# Patient Record
Sex: MALE | Race: OTHER | NOT HISPANIC OR LATINO | Employment: OTHER | URBAN - METROPOLITAN AREA
[De-identification: names, ages, dates, MRNs, and addresses within clinical notes are randomized per-mention and may not be internally consistent; named-entity substitution may affect disease eponyms.]

---

## 2023-04-11 ENCOUNTER — NEW PATIENT (OUTPATIENT)
Dept: URBAN - METROPOLITAN AREA CLINIC 53 | Facility: CLINIC | Age: 83
End: 2023-04-11

## 2023-04-11 DIAGNOSIS — H43.812: ICD-10-CM

## 2023-04-11 DIAGNOSIS — Z97.3: ICD-10-CM

## 2023-04-11 DIAGNOSIS — H16.223: ICD-10-CM

## 2023-04-11 DIAGNOSIS — H25.11: ICD-10-CM

## 2023-04-11 DIAGNOSIS — H25.812: ICD-10-CM

## 2023-04-11 PROCEDURE — 92004 COMPRE OPH EXAM NEW PT 1/>: CPT

## 2023-04-11 PROCEDURE — 92310-2E ESTABLISHED CL PATIENT TORIC (ASTIGMATISM) SINGLE VISION SOFT LENS EVALUATION

## 2023-04-11 ASSESSMENT — KERATOMETRY
OS_AXISANGLE_DEGREES: 119
OD_K2POWER_DIOPTERS: 41.75
OD_AXISANGLE_DEGREES: 62
OS_K2POWER_DIOPTERS: 43.50
OS_AXISANGLE2_DEGREES: 29
OS_K1POWER_DIOPTERS: 38.50
OD_K1POWER_DIOPTERS: 40.50
OD_AXISANGLE2_DEGREES: 152

## 2023-04-11 ASSESSMENT — VISUAL ACUITY
OS_CC: 20/50
OS_CC: 20/40
OS_SC: 20/50
OU_CC: 20/40-1
OU_SC: 20/50
OU_CC: 20/25
OU_CC: J1+@14"
OD_CC: 20/25-1
OD_SC: 20/150
OU_CC: J3@14"
OS_GLARE: 20/50-1
OD_CC: 20/50
OD_GLARE: 20/40
OD_GLARE: 20/40

## 2023-04-11 ASSESSMENT — TONOMETRY
OD_IOP_MMHG: 15
OS_IOP_MMHG: 15

## 2023-05-04 ENCOUNTER — CONTACT LENSES/GLASSES VISIT (OUTPATIENT)
Dept: URBAN - METROPOLITAN AREA CLINIC 53 | Facility: CLINIC | Age: 83
End: 2023-05-04

## 2023-05-04 DIAGNOSIS — H52.4: ICD-10-CM

## 2023-05-04 PROCEDURE — 92015GRNC REFRACTION GLASSES RECHECK NO CHARGE

## 2023-05-04 ASSESSMENT — KERATOMETRY
OD_AXISANGLE_DEGREES: 62
OS_AXISANGLE2_DEGREES: 29
OD_K1POWER_DIOPTERS: 40.50
OS_K1POWER_DIOPTERS: 38.50
OS_AXISANGLE_DEGREES: 119
OD_K2POWER_DIOPTERS: 41.75
OD_AXISANGLE2_DEGREES: 152
OS_K2POWER_DIOPTERS: 43.50

## 2023-05-04 ASSESSMENT — VISUAL ACUITY
OS_PH: 20/30-2
OD_PH: 20/25
OD_CC: 20/50-1
OS_CC: 20/60

## 2023-10-19 ENCOUNTER — COMPREHENSIVE EXAM (OUTPATIENT)
Dept: URBAN - METROPOLITAN AREA CLINIC 53 | Facility: CLINIC | Age: 83
End: 2023-10-19

## 2023-10-19 DIAGNOSIS — H25.812: ICD-10-CM

## 2023-10-19 DIAGNOSIS — Z97.3: ICD-10-CM

## 2023-10-19 DIAGNOSIS — Z98.890: ICD-10-CM

## 2023-10-19 DIAGNOSIS — H43.813: ICD-10-CM

## 2023-10-19 DIAGNOSIS — H52.4: ICD-10-CM

## 2023-10-19 DIAGNOSIS — H16.223: ICD-10-CM

## 2023-10-19 DIAGNOSIS — H25.11: ICD-10-CM

## 2023-10-19 PROCEDURE — 92134 CPTRZ OPH DX IMG PST SGM RTA: CPT

## 2023-10-19 PROCEDURE — 92015 DETERMINE REFRACTIVE STATE: CPT

## 2023-10-19 PROCEDURE — 92014 COMPRE OPH EXAM EST PT 1/>: CPT

## 2023-10-19 ASSESSMENT — VISUAL ACUITY
OD_GLARE: 20/50
OS_GLARE: 20/40
OS_CC: 20/50+2
OD_GLARE: 20/30
OD_CC: 20/50+2
OS_GLARE: 20/30
OU_CC: J1+

## 2023-10-19 ASSESSMENT — KERATOMETRY
OS_AXISANGLE_DEGREES: 119
OS_K2POWER_DIOPTERS: 43.50
OD_K2POWER_DIOPTERS: 41.75
OS_K1POWER_DIOPTERS: 38.50
OD_AXISANGLE2_DEGREES: 152
OS_AXISANGLE2_DEGREES: 29
OD_K1POWER_DIOPTERS: 40.50
OD_AXISANGLE_DEGREES: 62

## 2023-10-19 ASSESSMENT — TONOMETRY
OD_IOP_MMHG: 16
OS_IOP_MMHG: 15

## 2023-10-25 ENCOUNTER — APPOINTMENT (RX ONLY)
Dept: URBAN - METROPOLITAN AREA CLINIC 58 | Facility: CLINIC | Age: 83
Setting detail: DERMATOLOGY
End: 2023-10-25

## 2023-10-25 DIAGNOSIS — L738 OTHER SPECIFIED DISEASES OF HAIR AND HAIR FOLLICLES: ICD-10-CM | Status: INADEQUATELY CONTROLLED

## 2023-10-25 DIAGNOSIS — L57.0 ACTINIC KERATOSIS: ICD-10-CM | Status: INADEQUATELY CONTROLLED

## 2023-10-25 DIAGNOSIS — L57.8 OTHER SKIN CHANGES DUE TO CHRONIC EXPOSURE TO NONIONIZING RADIATION: ICD-10-CM

## 2023-10-25 DIAGNOSIS — L73.9 FOLLICULAR DISORDER, UNSPECIFIED: ICD-10-CM | Status: INADEQUATELY CONTROLLED

## 2023-10-25 DIAGNOSIS — L663 OTHER SPECIFIED DISEASES OF HAIR AND HAIR FOLLICLES: ICD-10-CM | Status: INADEQUATELY CONTROLLED

## 2023-10-25 DIAGNOSIS — D18.0 HEMANGIOMA: ICD-10-CM

## 2023-10-25 DIAGNOSIS — D22 MELANOCYTIC NEVI: ICD-10-CM

## 2023-10-25 DIAGNOSIS — L81.4 OTHER MELANIN HYPERPIGMENTATION: ICD-10-CM

## 2023-10-25 DIAGNOSIS — L82.1 OTHER SEBORRHEIC KERATOSIS: ICD-10-CM

## 2023-10-25 PROBLEM — L02.223 FURUNCLE OF CHEST WALL: Status: ACTIVE | Noted: 2023-10-25

## 2023-10-25 PROBLEM — D22.62 MELANOCYTIC NEVI OF LEFT UPPER LIMB, INCLUDING SHOULDER: Status: ACTIVE | Noted: 2023-10-25

## 2023-10-25 PROBLEM — D48.5 NEOPLASM OF UNCERTAIN BEHAVIOR OF SKIN: Status: ACTIVE | Noted: 2023-10-25

## 2023-10-25 PROBLEM — L02.02 FURUNCLE OF FACE: Status: ACTIVE | Noted: 2023-10-25

## 2023-10-25 PROBLEM — D18.01 HEMANGIOMA OF SKIN AND SUBCUTANEOUS TISSUE: Status: ACTIVE | Noted: 2023-10-25

## 2023-10-25 PROCEDURE — 11102 TANGNTL BX SKIN SINGLE LES: CPT

## 2023-10-25 PROCEDURE — ? PRESCRIPTION MEDICATION MANAGEMENT

## 2023-10-25 PROCEDURE — 17003 DESTRUCT PREMALG LES 2-14: CPT

## 2023-10-25 PROCEDURE — ? BIOPSY BY SHAVE METHOD

## 2023-10-25 PROCEDURE — 17000 DESTRUCT PREMALG LESION: CPT | Mod: 59

## 2023-10-25 PROCEDURE — 99204 OFFICE O/P NEW MOD 45 MIN: CPT | Mod: 25

## 2023-10-25 PROCEDURE — ? FULL BODY SKIN EXAM

## 2023-10-25 PROCEDURE — ? LIQUID NITROGEN

## 2023-10-25 PROCEDURE — ? PRESCRIPTION

## 2023-10-25 PROCEDURE — ? COUNSELING

## 2023-10-25 PROCEDURE — ? TREATMENT REGIMEN

## 2023-10-25 RX ORDER — DOXYCYCLINE HYCLATE 100 MG/1
CAPSULE, GELATIN COATED ORAL QD
Qty: 60 | Refills: 0 | Status: ERX | COMMUNITY
Start: 2023-10-25

## 2023-10-25 RX ORDER — CLINDAMYCIN PHOSPHATE 10 MG/ML
SOLUTION TOPICAL
Qty: 60 | Refills: 2 | Status: ERX | COMMUNITY
Start: 2023-10-25

## 2023-10-25 RX ADMIN — CLINDAMYCIN PHOSPHATE: 10 SOLUTION TOPICAL at 00:00

## 2023-10-25 RX ADMIN — DOXYCYCLINE HYCLATE: 100 CAPSULE, GELATIN COATED ORAL at 00:00

## 2023-10-25 ASSESSMENT — LOCATION ZONE DERM
LOCATION ZONE: SCALP
LOCATION ZONE: HAND
LOCATION ZONE: TRUNK
LOCATION ZONE: FACE
LOCATION ZONE: NOSE

## 2023-10-25 ASSESSMENT — LOCATION DETAILED DESCRIPTION DERM
LOCATION DETAILED: LEFT FOREHEAD
LOCATION DETAILED: 3RD WEB SPACE LEFT HAND
LOCATION DETAILED: LEFT ULNAR DORSAL HAND
LOCATION DETAILED: RIGHT SUPERIOR PARIETAL SCALP
LOCATION DETAILED: SUPERIOR MID FOREHEAD
LOCATION DETAILED: LEFT CHIN
LOCATION DETAILED: LEFT RADIAL DORSAL HAND
LOCATION DETAILED: LEFT LATERAL SUPERIOR CHEST
LOCATION DETAILED: RIGHT SUPERIOR MEDIAL BUCCAL CHEEK
LOCATION DETAILED: RIGHT LATERAL FOREHEAD
LOCATION DETAILED: NASAL SUPRATIP

## 2023-10-25 ASSESSMENT — LOCATION SIMPLE DESCRIPTION DERM
LOCATION SIMPLE: RIGHT CHEEK
LOCATION SIMPLE: CHIN
LOCATION SIMPLE: SUPERIOR FOREHEAD
LOCATION SIMPLE: NOSE
LOCATION SIMPLE: CHEST
LOCATION SIMPLE: RIGHT FOREHEAD
LOCATION SIMPLE: LEFT FOREHEAD
LOCATION SIMPLE: LEFT HAND
LOCATION SIMPLE: SCALP

## 2023-10-25 NOTE — PROCEDURE: PRESCRIPTION MEDICATION MANAGEMENT
Detail Level: Simple
Initiate Treatment: doxycycline hyclate QD for 60 days\\nclindamycin phosphate mornings as spot treatment
Render In Strict Bullet Format?: No

## 2024-01-24 ENCOUNTER — PRE-OP/H&P (OUTPATIENT)
Dept: URBAN - METROPOLITAN AREA CLINIC 53 | Facility: CLINIC | Age: 84
End: 2024-01-24

## 2024-01-24 DIAGNOSIS — H25.812: ICD-10-CM

## 2024-01-24 DIAGNOSIS — H43.813: ICD-10-CM

## 2024-01-24 DIAGNOSIS — H25.11: ICD-10-CM

## 2024-01-24 PROCEDURE — 92025IOL CORNEAL TOPOGRAPHY PREMIUM IOL

## 2024-01-24 PROCEDURE — 92134 CPTRZ OPH DX IMG PST SGM RTA: CPT

## 2024-01-24 PROCEDURE — 99214 OFFICE O/P EST MOD 30 MIN: CPT

## 2024-01-24 PROCEDURE — 92136 OPHTHALMIC BIOMETRY: CPT

## 2024-01-24 ASSESSMENT — KERATOMETRY
OD_K2POWER_DIOPTERS: 39.50
OD_K1POWER_DIOPTERS: 41.00
OD_AXISANGLE_DEGREES: 160
OD_AXISANGLE2_DEGREES: 70
OD_K1POWER_DIOPTERS: 39.00
OD_AXISANGLE2_DEGREES: 157
OD_AXISANGLE_DEGREES: 67
OS_AXISANGLE2_DEGREES: 20
OS_AXISANGLE_DEGREES: 110
OS_K2POWER_DIOPTERS: 41.00
OS_K1POWER_DIOPTERS: 42.12
OD_K2POWER_DIOPTERS: 40.75

## 2024-01-24 ASSESSMENT — VISUAL ACUITY
OD_CC: 20/40
OS_PH: 20/30
OS_CC: 20/40
OD_PH: 20/30

## 2024-01-24 ASSESSMENT — TONOMETRY
OD_IOP_MMHG: 15
OS_IOP_MMHG: 14

## 2024-01-31 ASSESSMENT — KERATOMETRY
OD_AXISANGLE_DEGREES: 160
OD_AXISANGLE2_DEGREES: 157
OS_K2POWER_DIOPTERS: 41.00
OD_K2POWER_DIOPTERS: 39.50
OD_K2POWER_DIOPTERS: 40.75
OS_AXISANGLE2_DEGREES: 20
OD_K1POWER_DIOPTERS: 39.00
OD_K1POWER_DIOPTERS: 41.00
OD_AXISANGLE_DEGREES: 67
OD_AXISANGLE2_DEGREES: 70
OS_AXISANGLE_DEGREES: 110
OS_K1POWER_DIOPTERS: 42.12

## 2024-02-01 ENCOUNTER — SURGERY/PROCEDURE (OUTPATIENT)
Dept: URBAN - METROPOLITAN AREA SURGERY 16 | Facility: SURGERY | Age: 84
End: 2024-02-01

## 2024-02-01 DIAGNOSIS — H25.811: ICD-10-CM

## 2024-02-01 PROCEDURE — 66984LAL CATARACT EXTRACTION WITH IOL, LAL

## 2024-02-01 PROCEDURE — 99199PPLAL LIGHT ADJUSTABLE LENS

## 2024-02-02 ENCOUNTER — POST-OP (OUTPATIENT)
Dept: URBAN - METROPOLITAN AREA CLINIC 53 | Facility: CLINIC | Age: 84
End: 2024-02-02

## 2024-02-02 DIAGNOSIS — Z98.41: ICD-10-CM

## 2024-02-02 PROCEDURE — 99024 POSTOP FOLLOW-UP VISIT: CPT

## 2024-02-02 ASSESSMENT — KERATOMETRY
OS_AXISANGLE_DEGREES: 110
OD_AXISANGLE_DEGREES: 67
OS_K1POWER_DIOPTERS: 42.12
OD_K2POWER_DIOPTERS: 39.50
OD_K2POWER_DIOPTERS: 40.75
OS_AXISANGLE2_DEGREES: 20
OS_K2POWER_DIOPTERS: 41.00
OD_K1POWER_DIOPTERS: 41.00
OD_AXISANGLE2_DEGREES: 157
OD_AXISANGLE2_DEGREES: 70
OD_AXISANGLE_DEGREES: 160
OD_K1POWER_DIOPTERS: 39.00

## 2024-02-02 ASSESSMENT — TONOMETRY: OD_IOP_MMHG: 19

## 2024-02-02 ASSESSMENT — VISUAL ACUITY: OD_SC: 20/70

## 2024-02-06 ENCOUNTER — POST OP/EVAL OF SECOND EYE (OUTPATIENT)
Dept: URBAN - METROPOLITAN AREA CLINIC 53 | Facility: CLINIC | Age: 84
End: 2024-02-06

## 2024-02-06 DIAGNOSIS — Z96.1: ICD-10-CM

## 2024-02-06 DIAGNOSIS — Z98.41: ICD-10-CM

## 2024-02-06 DIAGNOSIS — H25.812: ICD-10-CM

## 2024-02-06 PROCEDURE — 99213 OFFICE O/P EST LOW 20 MIN: CPT

## 2024-02-06 ASSESSMENT — KERATOMETRY
OS_AXISANGLE2_DEGREES: 20
OD_AXISANGLE_DEGREES: 67
OD_AXISANGLE2_DEGREES: 157
OD_AXISANGLE_DEGREES: 160
OD_K2POWER_DIOPTERS: 40.75
OD_K1POWER_DIOPTERS: 41.00
OD_K2POWER_DIOPTERS: 39.50
OD_AXISANGLE2_DEGREES: 70
OS_K1POWER_DIOPTERS: 42.12
OS_AXISANGLE_DEGREES: 110
OS_K2POWER_DIOPTERS: 41.00
OD_K1POWER_DIOPTERS: 39.00

## 2024-02-06 ASSESSMENT — TONOMETRY
OS_IOP_MMHG: 15
OD_IOP_MMHG: 15

## 2024-02-06 ASSESSMENT — VISUAL ACUITY
OS_CC: 20/30
OD_SC: 20/30

## 2024-02-12 ASSESSMENT — KERATOMETRY
OS_AXISANGLE2_DEGREES: 20
OD_K1POWER_DIOPTERS: 39.00
OS_K2POWER_DIOPTERS: 41.00
OD_AXISANGLE_DEGREES: 160
OD_AXISANGLE2_DEGREES: 157
OD_AXISANGLE_DEGREES: 67
OD_K2POWER_DIOPTERS: 40.75
OD_K2POWER_DIOPTERS: 39.50
OD_AXISANGLE2_DEGREES: 70
OS_K1POWER_DIOPTERS: 42.12
OD_K1POWER_DIOPTERS: 41.00
OS_AXISANGLE_DEGREES: 110

## 2024-02-15 ENCOUNTER — SURGERY/PROCEDURE (OUTPATIENT)
Dept: URBAN - METROPOLITAN AREA SURGERY 16 | Facility: SURGERY | Age: 84
End: 2024-02-15

## 2024-02-15 DIAGNOSIS — H25.812: ICD-10-CM

## 2024-02-15 PROCEDURE — 99199PPLAL LIGHT ADJUSTABLE LENS

## 2024-02-15 PROCEDURE — 66984LAL CATARACT EXTRACTION WITH IOL, LAL

## 2024-02-16 ENCOUNTER — POST-OP (OUTPATIENT)
Dept: URBAN - METROPOLITAN AREA CLINIC 53 | Facility: CLINIC | Age: 84
End: 2024-02-16

## 2024-02-16 DIAGNOSIS — Z98.42: ICD-10-CM

## 2024-02-16 DIAGNOSIS — Z96.1: ICD-10-CM

## 2024-02-16 ASSESSMENT — KERATOMETRY
OD_K2POWER_DIOPTERS: 39.50
OS_K2POWER_DIOPTERS: 41.00
OD_AXISANGLE_DEGREES: 160
OS_AXISANGLE2_DEGREES: 20
OD_AXISANGLE2_DEGREES: 157
OD_AXISANGLE2_DEGREES: 70
OD_K1POWER_DIOPTERS: 41.00
OD_K1POWER_DIOPTERS: 39.00
OD_K2POWER_DIOPTERS: 40.75
OD_AXISANGLE_DEGREES: 67
OS_AXISANGLE_DEGREES: 110
OS_K1POWER_DIOPTERS: 42.12

## 2024-02-16 ASSESSMENT — VISUAL ACUITY: OS_SC: 20/80+2

## 2024-02-16 ASSESSMENT — TONOMETRY: OS_IOP_MMHG: 22

## 2024-02-21 ENCOUNTER — POST-OP (OUTPATIENT)
Dept: URBAN - METROPOLITAN AREA CLINIC 53 | Facility: CLINIC | Age: 84
End: 2024-02-21

## 2024-02-21 DIAGNOSIS — Z98.42: ICD-10-CM

## 2024-02-21 DIAGNOSIS — Z96.1: ICD-10-CM

## 2024-02-21 PROCEDURE — 99024 POSTOP FOLLOW-UP VISIT: CPT

## 2024-02-21 PROCEDURE — 92134 CPTRZ OPH DX IMG PST SGM RTA: CPT

## 2024-02-21 ASSESSMENT — TONOMETRY
OS_IOP_MMHG: 12
OD_IOP_MMHG: 12

## 2024-02-21 ASSESSMENT — VISUAL ACUITY
OD_SC: 20/30
OU_SC: J2@12"
OS_SC: 20/60+2
OS_PH: 20/40

## 2024-04-17 ENCOUNTER — POST-OP (OUTPATIENT)
Dept: URBAN - METROPOLITAN AREA CLINIC 53 | Facility: CLINIC | Age: 84
End: 2024-04-17

## 2024-04-17 DIAGNOSIS — Z96.1: ICD-10-CM

## 2024-04-17 PROCEDURE — 99024 POSTOP FOLLOW-UP VISIT: CPT

## 2024-04-17 ASSESSMENT — VISUAL ACUITY
OU_SC: J3-2
OS_SC: J3-3
OD_SC: 20/30
OD_SC: J5
OU_SC: 20/25
OS_SC: 20/30

## 2024-04-24 ENCOUNTER — POST-OP (OUTPATIENT)
Dept: URBAN - METROPOLITAN AREA CLINIC 53 | Facility: CLINIC | Age: 84
End: 2024-04-24

## 2024-04-24 DIAGNOSIS — Z96.1: ICD-10-CM

## 2024-04-24 PROCEDURE — 99024 POSTOP FOLLOW-UP VISIT: CPT

## 2024-04-24 RX ORDER — PREDNISOLONE ACETATE 10 MG/ML: 1 SUSPENSION/ DROPS OPHTHALMIC ONCE A DAY

## 2024-04-24 ASSESSMENT — VISUAL ACUITY
OS_SC: 20/30-1
OD_SC: 20/25+1
OU_SC: 20/20-1

## 2024-05-01 ENCOUNTER — POST-OP (OUTPATIENT)
Dept: URBAN - METROPOLITAN AREA CLINIC 53 | Facility: CLINIC | Age: 84
End: 2024-05-01

## 2024-05-01 DIAGNOSIS — Z96.1: ICD-10-CM

## 2024-05-01 PROCEDURE — 99024 POSTOP FOLLOW-UP VISIT: CPT

## 2024-05-01 ASSESSMENT — KERATOMETRY
OS_K1POWER_DIOPTERS: 38.50
OS_AXISANGLE_DEGREES: 120
OD_K1POWER_DIOPTERS: 40.00
OS_AXISANGLE2_DEGREES: 030
OS_K2POWER_DIOPTERS: 43.75
OD_K2POWER_DIOPTERS: 41.50
OD_AXISANGLE2_DEGREES: 164
OD_AXISANGLE_DEGREES: 074

## 2024-05-01 ASSESSMENT — VISUAL ACUITY
OU_SC: 20/20+2
OS_SC: 20/15-2
OD_SC: 20/20

## 2024-05-08 ENCOUNTER — POST-OP (OUTPATIENT)
Dept: URBAN - METROPOLITAN AREA CLINIC 53 | Facility: CLINIC | Age: 84
End: 2024-05-08

## 2024-05-08 DIAGNOSIS — Z96.1: ICD-10-CM

## 2024-05-08 PROCEDURE — 99024 POSTOP FOLLOW-UP VISIT: CPT

## 2024-05-08 ASSESSMENT — VISUAL ACUITY
OD_SC: 20/30
OS_SC: 20/50
OU_SC: 20/25

## 2024-05-08 ASSESSMENT — KERATOMETRY
OD_K1POWER_DIOPTERS: 40.00
OD_K2POWER_DIOPTERS: 41.50
OD_AXISANGLE2_DEGREES: 164
OS_K2POWER_DIOPTERS: 43.75
OS_K1POWER_DIOPTERS: 38.50
OS_AXISANGLE_DEGREES: 120
OS_AXISANGLE2_DEGREES: 030
OD_AXISANGLE_DEGREES: 074

## 2024-05-15 ENCOUNTER — POST-OP (OUTPATIENT)
Dept: URBAN - METROPOLITAN AREA CLINIC 53 | Facility: CLINIC | Age: 84
End: 2024-05-15

## 2024-05-15 DIAGNOSIS — Z96.1: ICD-10-CM

## 2024-05-15 PROCEDURE — 99024 POSTOP FOLLOW-UP VISIT: CPT

## 2024-05-15 ASSESSMENT — KERATOMETRY
OS_K1POWER_DIOPTERS: 38.50
OD_K1POWER_DIOPTERS: 40.00
OD_AXISANGLE2_DEGREES: 164
OS_AXISANGLE_DEGREES: 120
OS_K2POWER_DIOPTERS: 43.75
OD_K2POWER_DIOPTERS: 41.50
OS_AXISANGLE2_DEGREES: 030
OD_AXISANGLE_DEGREES: 074

## 2024-05-15 ASSESSMENT — VISUAL ACUITY
OS_SC: 20/30
OU_SC: 20/20-2 PUSH
OD_SC: 20/25-1 PUSH

## 2024-05-22 ENCOUNTER — APPOINTMENT (RX ONLY)
Dept: URBAN - METROPOLITAN AREA CLINIC 58 | Facility: CLINIC | Age: 84
Setting detail: DERMATOLOGY
End: 2024-05-22

## 2024-05-22 DIAGNOSIS — L73.9 FOLLICULAR DISORDER, UNSPECIFIED: ICD-10-CM

## 2024-05-22 DIAGNOSIS — L663 OTHER SPECIFIED DISEASES OF HAIR AND HAIR FOLLICLES: ICD-10-CM

## 2024-05-22 DIAGNOSIS — L738 OTHER SPECIFIED DISEASES OF HAIR AND HAIR FOLLICLES: ICD-10-CM

## 2024-05-22 PROBLEM — L02.02 FURUNCLE OF FACE: Status: ACTIVE | Noted: 2024-05-22

## 2024-05-22 PROBLEM — L02.223 FURUNCLE OF CHEST WALL: Status: ACTIVE | Noted: 2024-05-22

## 2024-05-22 PROCEDURE — ? MDM - TREATMENT GOALS

## 2024-05-22 PROCEDURE — ? ADDITIONAL NOTES

## 2024-05-22 PROCEDURE — ? PRESCRIPTION

## 2024-05-22 PROCEDURE — 99214 OFFICE O/P EST MOD 30 MIN: CPT

## 2024-05-22 PROCEDURE — ? PRESCRIPTION MEDICATION MANAGEMENT

## 2024-05-22 PROCEDURE — ? COUNSELING

## 2024-05-22 RX ORDER — DOXYCYCLINE HYCLATE 50 MG/1
CAPSULE, GELATIN COATED ORAL
Qty: 60 | Refills: 4 | Status: ERX | COMMUNITY
Start: 2024-05-22

## 2024-05-22 RX ORDER — CLINDAMYCIN PHOSPHATE AND BENZOYL PEROXIDE 1 %-5 %
KIT TOPICAL
Qty: 50 | Refills: 3 | Status: ERX | COMMUNITY
Start: 2024-05-22

## 2024-05-22 RX ADMIN — CLINDAMYCIN PHOSPHATE AND BENZOYL PEROXIDE: KIT at 00:00

## 2024-05-22 RX ADMIN — DOXYCYCLINE HYCLATE: 50 CAPSULE, GELATIN COATED ORAL at 00:00

## 2024-05-22 ASSESSMENT — LOCATION DETAILED DESCRIPTION DERM
LOCATION DETAILED: RIGHT SUPERIOR MEDIAL BUCCAL CHEEK
LOCATION DETAILED: LEFT CHIN
LOCATION DETAILED: LEFT LATERAL SUPERIOR CHEST

## 2024-05-22 ASSESSMENT — LOCATION ZONE DERM
LOCATION ZONE: FACE
LOCATION ZONE: TRUNK

## 2024-05-22 ASSESSMENT — LOCATION SIMPLE DESCRIPTION DERM
LOCATION SIMPLE: RIGHT CHEEK
LOCATION SIMPLE: CHEST
LOCATION SIMPLE: CHIN

## 2024-05-22 NOTE — PROCEDURE: ADDITIONAL NOTES
Render Risk Assessment In Note?: no
Detail Level: Simple
Additional Notes: pt with significant Folliculitis' on face,neck, and trunk that cleared significantly with doxy 100mg daily for 2 months. Pt was very happy but then after running out of medication, the foliculitis returned. Pt was inconsistent with using topical clindamycin. Will asses response with lower subantimicrobial dosing of doxy and add benzaclin. Consider long term maintenance of doxy 40 or 50mg if uncontrolled with topicals.

## 2024-05-22 NOTE — PROCEDURE: PRESCRIPTION MEDICATION MANAGEMENT
Detail Level: Simple
Initiate Treatment: doxycycline hyclate 50 mg capsule - Take 1 tab QAM with food and water. If no improvement after 2-3 weeks, advised patient to increase to 100 mg. \\n\\nclindamycin 1 %-benzoyl peroxide 5 % topical gel: Apply once a day in the morning to affected areas on face and neck as spot treatment.
Render In Strict Bullet Format?: No
Discontinue Regimen: clindamycin phosphate

## 2024-05-31 ENCOUNTER — POST-OP (OUTPATIENT)
Dept: URBAN - METROPOLITAN AREA CLINIC 53 | Facility: CLINIC | Age: 84
End: 2024-05-31

## 2024-05-31 DIAGNOSIS — Z96.1: ICD-10-CM

## 2024-05-31 PROCEDURE — 66984LALA LAL ADJUSTMENT

## 2024-05-31 ASSESSMENT — KERATOMETRY
OD_AXISANGLE2_DEGREES: 164
OS_K1POWER_DIOPTERS: 38.50
OD_K2POWER_DIOPTERS: 41.50
OS_AXISANGLE_DEGREES: 120
OD_K1POWER_DIOPTERS: 40.00
OS_K2POWER_DIOPTERS: 43.75
OS_AXISANGLE2_DEGREES: 030
OD_AXISANGLE_DEGREES: 074

## 2024-05-31 ASSESSMENT — VISUAL ACUITY
OS_SC: 20/30-2 PUSH
OU_SC: J1+ @ 14"
OU_SC: 20/20
OD_SC: 20/20-1 PUSH
OS_SC: J1+

## 2024-06-07 ENCOUNTER — POST-OP (OUTPATIENT)
Dept: URBAN - METROPOLITAN AREA CLINIC 53 | Facility: CLINIC | Age: 84
End: 2024-06-07

## 2024-06-07 DIAGNOSIS — Z96.1: ICD-10-CM

## 2024-06-07 PROCEDURE — 66984LALA LAL ADJUSTMENT

## 2024-06-07 ASSESSMENT — VISUAL ACUITY
OS_SC: 20/40-2
OU_SC: 20/20-1
OD_SC: 20/20 PUSH

## 2024-06-07 ASSESSMENT — KERATOMETRY
OS_AXISANGLE_DEGREES: 120
OD_K1POWER_DIOPTERS: 40.00
OD_AXISANGLE2_DEGREES: 164
OD_AXISANGLE_DEGREES: 074
OS_AXISANGLE2_DEGREES: 030
OD_K2POWER_DIOPTERS: 41.50
OS_K1POWER_DIOPTERS: 38.50
OS_K2POWER_DIOPTERS: 43.75

## 2024-08-21 ENCOUNTER — APPOINTMENT (RX ONLY)
Dept: URBAN - METROPOLITAN AREA CLINIC 58 | Facility: CLINIC | Age: 84
Setting detail: DERMATOLOGY
End: 2024-08-21

## 2024-08-21 DIAGNOSIS — L663 OTHER SPECIFIED DISEASES OF HAIR AND HAIR FOLLICLES: ICD-10-CM

## 2024-08-21 DIAGNOSIS — L738 OTHER SPECIFIED DISEASES OF HAIR AND HAIR FOLLICLES: ICD-10-CM

## 2024-08-21 DIAGNOSIS — L73.9 FOLLICULAR DISORDER, UNSPECIFIED: ICD-10-CM

## 2024-08-21 PROBLEM — L02.12 FURUNCLE OF NECK: Status: ACTIVE | Noted: 2024-08-21

## 2024-08-21 PROBLEM — L02.02 FURUNCLE OF FACE: Status: ACTIVE | Noted: 2024-08-21

## 2024-08-21 PROCEDURE — ? MDM - TREATMENT GOALS

## 2024-08-21 PROCEDURE — ? PRESCRIPTION MEDICATION MANAGEMENT

## 2024-08-21 PROCEDURE — ? COUNSELING

## 2024-08-21 PROCEDURE — ? ADDITIONAL NOTES

## 2024-08-21 PROCEDURE — 99214 OFFICE O/P EST MOD 30 MIN: CPT

## 2024-08-21 ASSESSMENT — LOCATION DETAILED DESCRIPTION DERM
LOCATION DETAILED: LEFT CHIN
LOCATION DETAILED: RIGHT SUPERIOR ANTERIOR NECK
LOCATION DETAILED: RIGHT SUPERIOR MEDIAL BUCCAL CHEEK

## 2024-08-21 ASSESSMENT — LOCATION SIMPLE DESCRIPTION DERM
LOCATION SIMPLE: RIGHT ANTERIOR NECK
LOCATION SIMPLE: CHIN
LOCATION SIMPLE: RIGHT CHEEK

## 2024-08-21 ASSESSMENT — LOCATION ZONE DERM
LOCATION ZONE: NECK
LOCATION ZONE: FACE

## 2024-08-21 NOTE — PROCEDURE: PRESCRIPTION MEDICATION MANAGEMENT
Continue Regimen: doxycycline hyclate 50 mg capsule - Take 1 tab QAM with food and water. If no improvement after 2-3 weeks, advised patient to increase to 100 mg. \\n\\nclindamycin 1 %-benzoyl peroxide 5 % topical gel: Apply once a day in the morning to affected areas on face and neck as spot treatment.
Detail Level: Simple
Render In Strict Bullet Format?: No
Plan: Patient recommended to apply OTC Hibiclens to affected areas

## 2024-08-21 NOTE — PROCEDURE: ADDITIONAL NOTES
Render Risk Assessment In Note?: no
Detail Level: Simple
Additional Notes: pt with significant Folliculitis' on face,neck, and trunk that cleared significantly with doxy 100mg daily for 2 months. Pt was very happy but then after running out of medication, the foliculitis returned. Pt was inconsistent with using topical clindamycin. Will asses response with lower subantimicrobial dosing of doxy and add benzaclin. Consider long term maintenance of doxy 40 or 50mg if uncontrolled with topicals.\\n\\n8/21 - improved overall but still flaring, will add hibiclens washes to area